# Patient Record
Sex: FEMALE | Race: WHITE | Employment: STUDENT | URBAN - NONMETROPOLITAN AREA
[De-identification: names, ages, dates, MRNs, and addresses within clinical notes are randomized per-mention and may not be internally consistent; named-entity substitution may affect disease eponyms.]

---

## 2023-02-28 ENCOUNTER — HOSPITAL ENCOUNTER (EMERGENCY)
Age: 23
Discharge: HOME | End: 2023-02-28
Payer: COMMERCIAL

## 2023-02-28 VITALS
HEIGHT: 65 IN | WEIGHT: 120 LBS | BODY MASS INDEX: 19.99 KG/M2 | SYSTOLIC BLOOD PRESSURE: 116 MMHG | TEMPERATURE: 98.3 F | DIASTOLIC BLOOD PRESSURE: 69 MMHG | HEART RATE: 110 BPM | OXYGEN SATURATION: 98 % | RESPIRATION RATE: 18 BRPM

## 2023-02-28 DIAGNOSIS — L73.9 FOLLICULITIS: ICD-10-CM

## 2023-02-28 DIAGNOSIS — J02.9 ACUTE PHARYNGITIS, UNSPECIFIED ETIOLOGY: Primary | ICD-10-CM

## 2023-02-28 DIAGNOSIS — A60.00 HERPES SIMPLEX INFECTION OF GENITOURINARY SYSTEM: ICD-10-CM

## 2023-02-28 LAB — S PYO AG THROAT QL: NEGATIVE

## 2023-02-28 PROCEDURE — 87253 VIRUS INOCULATE TISSUE ADDL: CPT

## 2023-02-28 PROCEDURE — 87252 VIRUS INOCULATION TISSUE: CPT

## 2023-02-28 PROCEDURE — 99213 OFFICE O/P EST LOW 20 MIN: CPT | Performed by: NURSE PRACTITIONER

## 2023-02-28 PROCEDURE — 99203 OFFICE O/P NEW LOW 30 MIN: CPT

## 2023-02-28 PROCEDURE — 87651 STREP A DNA AMP PROBE: CPT

## 2023-02-28 PROCEDURE — 87140 CULTURE TYPE IMMUNOFLUORESC: CPT

## 2023-02-28 RX ORDER — IBUPROFEN 800 MG/1
800 TABLET ORAL EVERY 8 HOURS PRN
Qty: 30 TABLET | Refills: 0 | Status: SHIPPED | OUTPATIENT
Start: 2023-02-28 | End: 2023-03-10

## 2023-02-28 RX ORDER — SULFAMETHOXAZOLE AND TRIMETHOPRIM 800; 160 MG/1; MG/1
1 TABLET ORAL 2 TIMES DAILY
Qty: 20 TABLET | Refills: 0 | Status: SHIPPED | OUTPATIENT
Start: 2023-02-28 | End: 2023-03-10

## 2023-02-28 RX ORDER — VALACYCLOVIR HYDROCHLORIDE 1 G/1
1000 TABLET, FILM COATED ORAL 2 TIMES DAILY
Qty: 14 TABLET | Refills: 0 | Status: SHIPPED | OUTPATIENT
Start: 2023-02-28 | End: 2023-03-07

## 2023-02-28 ASSESSMENT — PAIN DESCRIPTION - PAIN TYPE: TYPE: ACUTE PAIN

## 2023-02-28 ASSESSMENT — ENCOUNTER SYMPTOMS
SHORTNESS OF BREATH: 0
EYE DISCHARGE: 0
VOMITING: 0
COUGH: 0
TROUBLE SWALLOWING: 0
DIARRHEA: 0
NAUSEA: 0
SORE THROAT: 1
EYE REDNESS: 0
RHINORRHEA: 0

## 2023-02-28 ASSESSMENT — PAIN DESCRIPTION - ONSET: ONSET: GRADUAL

## 2023-02-28 ASSESSMENT — PAIN - FUNCTIONAL ASSESSMENT
PAIN_FUNCTIONAL_ASSESSMENT: ACTIVITIES ARE NOT PREVENTED
PAIN_FUNCTIONAL_ASSESSMENT: 0-10

## 2023-02-28 ASSESSMENT — PAIN DESCRIPTION - FREQUENCY: FREQUENCY: CONTINUOUS

## 2023-02-28 ASSESSMENT — PAIN DESCRIPTION - LOCATION: LOCATION: THROAT

## 2023-02-28 ASSESSMENT — PAIN DESCRIPTION - DESCRIPTORS: DESCRIPTORS: ACHING;SORE

## 2023-02-28 ASSESSMENT — PAIN SCALES - GENERAL: PAINLEVEL_OUTOF10: 7

## 2023-02-28 NOTE — ED NOTES
In with Alejandro Carter CNP for visual exam labia.  HSV swab done and sent to lab     Po Navarrete RN  02/28/23 [id number]

## 2023-02-28 NOTE — DISCHARGE INSTRUCTIONS
Take all medications as prescribed. You may use Tylenol or Motrin per package instructions for pain. Seek emergency treatment for fever greater than 101.5 for greater than 3 days, increased pain, or new or unusual symptoms.

## 2023-02-28 NOTE — Clinical Note
William Hernandez was seen and treated in our emergency department on 2/28/2023. She may return to school on 03/02/2023. If you have any questions or concerns, please don't hesitate to call.       Bret Mcnamara, HANNAH - CNP

## 2023-02-28 NOTE — ED PROVIDER NOTES
[facility] URGENT CARE  Urgent Care Encounter      CHIEF COMPLAINT       Chief Complaint   Patient presents with    Pharyngitis    Other     Pt thinks she cut herself while shaving.       Nurses Notes reviewed and I agree except as noted in the HPI.  HISTORY OF PRESENT ILLNESS   Krissy Chao is a 22 y.o. female who presents with a sore throat, fever, and lesions on her labia since Thursday.  Patient states she shaved in her genital area 4 days ago and has had mild pain since.  She went to the [facility] and was told that she had herpes.  She did not receive treatment at that time.  She is sexually active with 1 partner.  She denies chance of pregnancy at today's visit.  She declines further STD testing today.    REVIEW OF SYSTEMS     Review of Systems   Constitutional:  Negative for chills, diaphoresis, fatigue and fever.   HENT:  Positive for sore throat. Negative for congestion, ear pain, rhinorrhea and trouble swallowing.    Eyes:  Negative for discharge and redness.   Respiratory:  Negative for cough and shortness of breath.    Cardiovascular:  Negative for chest pain.   Gastrointestinal:  Negative for diarrhea, nausea and vomiting.   Genitourinary:  Negative for decreased urine volume.        Lesion on labia   Musculoskeletal:  Negative for neck pain and neck stiffness.   Skin:  Negative for rash.   Neurological:  Negative for headaches.   Hematological:  Negative for adenopathy.   Psychiatric/Behavioral:  Negative for sleep disturbance.      PAST MEDICAL HISTORY   History reviewed. No pertinent past medical history.    SURGICAL HISTORY     Patient  has no past surgical history on file.    CURRENT MEDICATIONS       Previous Medications    No medications on file       ALLERGIES     Patient is has No Known Allergies.    FAMILY HISTORY     Patient'sfamily history is not on file.    SOCIAL HISTORY     Patient  reports that she has never smoked. She has never been exposed to tobacco smoke. She has  never used smokeless tobacco. She reports that she does not drink alcohol and does not use drugs. PHYSICAL EXAM     ED TRIAGE VITALS  BP: 116/69, Temp: 98.3 °F (36.8 °C), Heart Rate: (!) 110, Resp: 18, SpO2: 98 %  Physical Exam  Vitals and nursing note reviewed. Exam conducted with a chaperone present. Constitutional:       General: She is not in acute distress. Appearance: Normal appearance. She is normal weight. HENT:      Head: Normocephalic and atraumatic. Right Ear: Tympanic membrane normal.      Left Ear: Tympanic membrane normal.      Nose: Nose normal.      Mouth/Throat:      Mouth: Mucous membranes are moist.      Pharynx: Oropharynx is clear. Eyes:      Conjunctiva/sclera: Conjunctivae normal.   Cardiovascular:      Rate and Rhythm: Normal rate and regular rhythm. Heart sounds: Normal heart sounds. Pulmonary:      Effort: Pulmonary effort is normal. No respiratory distress. Breath sounds: Normal breath sounds. No wheezing. Abdominal:      General: Abdomen is flat. Bowel sounds are normal.      Palpations: Abdomen is soft. Genitourinary:     Exam position: Supine. Labia:         Right: Lesion present. Left: Lesion present. Comments: Vesicular lesions to smith, left labia, and right labia. Pustular lesions to labia minora bilaterally  Musculoskeletal:         General: Normal range of motion. Cervical back: Normal range of motion and neck supple. Lymphadenopathy:      Cervical: No cervical adenopathy. Skin:     General: Skin is warm and dry. Capillary Refill: Capillary refill takes less than 2 seconds. Neurological:      General: No focal deficit present. Mental Status: She is alert and oriented to person, place, and time. Psychiatric:         Mood and Affect: Mood normal.         Behavior: Behavior normal.         Thought Content:  Thought content normal.         Judgment: Judgment normal.       DIAGNOSTIC RESULTS   Labs:  Results for orders placed or performed during the hospital encounter of 23   Strep Screen Group A Throat   Result Value Ref Range    Rapid Strep A Screen NEGATIVE        IMAGING:  No orders to display      URGENT CARE COURSE:     Vitals:    23 1433   BP: 116/69   Pulse: (!) 110   Resp: 18   Temp: 98.3 °F (36.8 °C)   TempSrc: Temporal   SpO2: 98%   Weight: 120 lb (54.4 kg)   Height: 5' 5\" (1.651 m)       Medications - No data to display  PROCEDURES:  None  FINAL IMPRESSION      1. Acute pharyngitis, unspecified etiology    2. Herpes simplex infection of genitourinary system    3. Folliculitis        DISPOSITION/PLAN   DISPOSITION Decision To Discharge 2023 03:00:18 PM    Patient potentially has genital herpes. We will send culture off and will treat based on physical exam today. We will also treat for folliculitis. Discharged home in good condition. Advised to follow-up with the PCP in 1 week for recheck.   PATIENT REFERRED TO:  [address]  [address] [place] [id number]  In 1 week  If symptoms worsen  DISCHARGE MEDICATIONS:  New Prescriptions    IBUPROFEN (ADVIL;MOTRIN) 800 MG TABLET    Take 1 tablet by mouth every 8 hours as needed for Pain or Fever    SULFAMETHOXAZOLE-TRIMETHOPRIM (BACTRIM DS) 800-160 MG PER TABLET    Take 1 tablet by mouth 2 times daily for 10 days    VALACYCLOVIR (VALTREX) 1 G TABLET    Take 1 tablet by mouth 2 times daily for 7 days     Current Discharge Medication List            [facility]  [address] HANNAH Rae - CNP  23 [id number]

## 2023-02-28 NOTE — ED TRIAGE NOTES
Pt to [facility] ambulatory with a sore throat, and has a laceration on her labia from shaving. This started on Sunday.

## 2023-03-03 LAB
HSV SPEC CULT: NORMAL
HSV1 ISLT QL IF: NORMAL

## 2024-04-16 ENCOUNTER — OFFICE VISIT (OUTPATIENT)
Age: 24
End: 2024-04-16

## 2024-04-16 VITALS
BODY MASS INDEX: 19.99 KG/M2 | DIASTOLIC BLOOD PRESSURE: 80 MMHG | RESPIRATION RATE: 14 BRPM | WEIGHT: 120 LBS | HEIGHT: 65 IN | HEART RATE: 79 BPM | TEMPERATURE: 98.1 F | SYSTOLIC BLOOD PRESSURE: 100 MMHG | OXYGEN SATURATION: 100 %

## 2024-04-16 DIAGNOSIS — J02.9 ACUTE VIRAL PHARYNGITIS: Primary | ICD-10-CM

## 2024-04-16 LAB
HETEROPHILE ANTIBODIES: NEGATIVE
STREPTOCOCCUS A RNA: NEGATIVE

## 2024-04-16 PROCEDURE — 87651 STREP A DNA AMP PROBE: CPT | Performed by: NURSE PRACTITIONER

## 2024-04-16 PROCEDURE — 99213 OFFICE O/P EST LOW 20 MIN: CPT | Performed by: NURSE PRACTITIONER

## 2024-04-16 PROCEDURE — 86308 HETEROPHILE ANTIBODY SCREEN: CPT | Performed by: NURSE PRACTITIONER

## 2024-04-16 RX ORDER — NORETHINDRONE ACETATE AND ETHINYL ESTRADIOL, ETHINYL ESTRADIOL AND FERROUS FUMARATE 1MG-10(24)
1 KIT ORAL DAILY
COMMUNITY
Start: 2024-04-08

## 2024-04-16 SDOH — ECONOMIC STABILITY: HOUSING INSECURITY
IN THE LAST 12 MONTHS, WAS THERE A TIME WHEN YOU DID NOT HAVE A STEADY PLACE TO SLEEP OR SLEPT IN A SHELTER (INCLUDING NOW)?: NO

## 2024-04-16 SDOH — ECONOMIC STABILITY: INCOME INSECURITY: HOW HARD IS IT FOR YOU TO PAY FOR THE VERY BASICS LIKE FOOD, HOUSING, MEDICAL CARE, AND HEATING?: NOT VERY HARD

## 2024-04-16 SDOH — ECONOMIC STABILITY: FOOD INSECURITY: WITHIN THE PAST 12 MONTHS, YOU WORRIED THAT YOUR FOOD WOULD RUN OUT BEFORE YOU GOT MONEY TO BUY MORE.: NEVER TRUE

## 2024-04-16 SDOH — ECONOMIC STABILITY: FOOD INSECURITY: WITHIN THE PAST 12 MONTHS, THE FOOD YOU BOUGHT JUST DIDN'T LAST AND YOU DIDN'T HAVE MONEY TO GET MORE.: NEVER TRUE

## 2024-04-16 ASSESSMENT — PATIENT HEALTH QUESTIONNAIRE - PHQ9
2. FEELING DOWN, DEPRESSED OR HOPELESS: NOT AT ALL
SUM OF ALL RESPONSES TO PHQ QUESTIONS 1-9: 0
SUM OF ALL RESPONSES TO PHQ QUESTIONS 1-9: 0
SUM OF ALL RESPONSES TO PHQ9 QUESTIONS 1 & 2: 0
SUM OF ALL RESPONSES TO PHQ QUESTIONS 1-9: 0
SUM OF ALL RESPONSES TO PHQ QUESTIONS 1-9: 0
1. LITTLE INTEREST OR PLEASURE IN DOING THINGS: NOT AT ALL

## 2024-04-16 ASSESSMENT — ENCOUNTER SYMPTOMS
SORE THROAT: 1
DIARRHEA: 0
COUGH: 0
NAUSEA: 0
ABDOMINAL PAIN: 0
SHORTNESS OF BREATH: 0
WHEEZING: 0
VOMITING: 0

## 2024-04-16 NOTE — PROGRESS NOTES
[facility] [place] [facility]  [facility] - [facility]  [address] [address]  Dept: [phone]  Loc: [phone]     Krissy Chao is a 23 y.o. female who presents today for:  Chief Complaint   Patient presents with    Pharyngitis     X 2 DAYS        Assessment/Plan:     1. Acute viral pharyngitis  -strep and mono neg, declines throat culture.  F/u if no improvement after 7-10 days or if develop a fever.       Results for orders placed or performed in visit on 04/16/24   POCT Rapid Strep A DNA (Alere i)   Result Value Ref Range    Streptococcus A RNA NEGATIVE    POCT Infectious mononucleosis Abs (mono)   Result Value Ref Range    Monospot NEGATIVE         Medications Ordered:  No orders of the defined types were placed in this encounter.      No follow-ups on file.    No future appointments.    HPI:   Pt presents with sore throat and chills x2 days.  Denies fever, cough, and congestion.  Has taken Tylenol with minimal relief.    Pharyngitis  This is a new problem. The current episode started in the past 7 days. Associated symptoms include chills and a sore throat. Pertinent negatives include no abdominal pain, congestion, coughing, fever, headaches, myalgias, nausea, rash or vomiting. She has tried acetaminophen for the symptoms. The treatment provided mild relief.         Subjective:      Review of Systems   Constitutional:  Positive for chills. Negative for fever.   HENT:  Positive for sore throat. Negative for congestion.    Respiratory:  Negative for cough, shortness of breath and wheezing.    Gastrointestinal:  Negative for abdominal pain, diarrhea, nausea and vomiting.   Musculoskeletal:  Negative for myalgias.   Skin:  Negative for rash.   Neurological:  Negative for dizziness, light-headedness and headaches.         Objective:     Vitals:    04/16/24 1436   BP: 100/80   Pulse: 79   Resp: 14   Temp: 98.1 °F (36.7 °C)   TempSrc: Tympanic   SpO2: 100%

## 2024-08-28 ENCOUNTER — OFFICE VISIT (OUTPATIENT)
Age: 24
End: 2024-08-28

## 2024-08-28 ENCOUNTER — OFFICE VISIT (OUTPATIENT)
Age: 24
End: 2024-08-28
Payer: COMMERCIAL

## 2024-08-28 VITALS
SYSTOLIC BLOOD PRESSURE: 118 MMHG | DIASTOLIC BLOOD PRESSURE: 80 MMHG | HEIGHT: 65 IN | WEIGHT: 110 LBS | OXYGEN SATURATION: 98 % | RESPIRATION RATE: 18 BRPM | BODY MASS INDEX: 18.33 KG/M2 | HEART RATE: 72 BPM | TEMPERATURE: 99.3 F

## 2024-08-28 DIAGNOSIS — F50.02 ANOREXIA NERVOSA, BINGE EATING/PURGING TYPE: Primary | ICD-10-CM

## 2024-08-28 DIAGNOSIS — F41.1 GENERALIZED ANXIETY DISORDER: ICD-10-CM

## 2024-08-28 DIAGNOSIS — F50.02 ANOREXIA NERVOSA, BINGE-EATING PURGING TYPE: Primary | ICD-10-CM

## 2024-08-28 PROBLEM — N39.41 URGE INCONTINENCE OF URINE: Status: RESOLVED | Noted: 2024-06-18 | Resolved: 2024-08-28

## 2024-08-28 PROBLEM — F50.029 ANOREXIA NERVOSA, BINGE EATING/PURGING TYPE: Status: ACTIVE | Noted: 2024-08-28

## 2024-08-28 PROBLEM — N39.41 URGE INCONTINENCE OF URINE: Status: ACTIVE | Noted: 2024-06-18

## 2024-08-28 LAB
25(OH)D3 SERPL-MCNC: 43 NG/ML (ref 30–100)
ALBUMIN SERPL BCG-MCNC: 5.2 G/DL (ref 3.5–5.1)
ALP SERPL-CCNC: 35 U/L (ref 38–126)
ALT SERPL W/O P-5'-P-CCNC: 26 U/L (ref 11–66)
ANION GAP SERPL CALC-SCNC: 14 MEQ/L (ref 8–16)
AST SERPL-CCNC: 24 U/L (ref 5–40)
BASOPHILS ABSOLUTE: 0 THOU/MM3 (ref 0–0.1)
BASOPHILS NFR BLD AUTO: 1.2 %
BILIRUB SERPL-MCNC: 0.6 MG/DL (ref 0.3–1.2)
BUN SERPL-MCNC: 15 MG/DL (ref 7–22)
CALCIUM SERPL-MCNC: 9.7 MG/DL (ref 8.5–10.5)
CHLORIDE SERPL-SCNC: 97 MEQ/L (ref 98–111)
CO2 SERPL-SCNC: 24 MEQ/L (ref 23–33)
CREAT SERPL-MCNC: 0.8 MG/DL (ref 0.4–1.2)
DEPRECATED RDW RBC AUTO: 49.7 FL (ref 35–45)
EOSINOPHIL NFR BLD AUTO: 0.9 %
EOSINOPHILS ABSOLUTE: 0 THOU/MM3 (ref 0–0.4)
ERYTHROCYTE [DISTWIDTH] IN BLOOD BY AUTOMATED COUNT: 13.6 % (ref 11.5–14.5)
GFR SERPL CREATININE-BSD FRML MDRD: > 90 ML/MIN/1.73M2
GLUCOSE SERPL-MCNC: 85 MG/DL (ref 70–108)
HCT VFR BLD AUTO: 41 % (ref 37–47)
HGB BLD-MCNC: 13.5 GM/DL (ref 12–16)
IMM GRANULOCYTES # BLD AUTO: 0 THOU/MM3 (ref 0–0.07)
IMM GRANULOCYTES NFR BLD AUTO: 0 %
INR PPP: 0.97 (ref 0.85–1.13)
LYMPHOCYTES ABSOLUTE: 1.8 THOU/MM3 (ref 1–4.8)
LYMPHOCYTES NFR BLD AUTO: 53.1 %
MCH RBC QN AUTO: 32.7 PG (ref 26–33)
MCHC RBC AUTO-ENTMCNC: 32.9 GM/DL (ref 32.2–35.5)
MCV RBC AUTO: 99.3 FL (ref 81–99)
MONOCYTES ABSOLUTE: 0.2 THOU/MM3 (ref 0.4–1.3)
MONOCYTES NFR BLD AUTO: 7.4 %
NEUTROPHILS ABSOLUTE: 1.2 THOU/MM3 (ref 1.8–7.7)
NEUTROPHILS NFR BLD AUTO: 37.4 %
NRBC BLD AUTO-RTO: 0 /100 WBC
PLATELET # BLD AUTO: 233 THOU/MM3 (ref 130–400)
PMV BLD AUTO: 9.8 FL (ref 9.4–12.4)
POTASSIUM SERPL-SCNC: 4.3 MEQ/L (ref 3.5–5.2)
PROT SERPL-MCNC: 7.5 G/DL (ref 6.1–8)
RBC # BLD AUTO: 4.13 MILL/MM3 (ref 4.2–5.4)
SODIUM SERPL-SCNC: 135 MEQ/L (ref 135–145)
TSH SERPL DL<=0.005 MIU/L-ACNC: 1.09 UIU/ML (ref 0.4–4.2)
WBC # BLD AUTO: 3.3 THOU/MM3 (ref 4.8–10.8)

## 2024-08-28 PROCEDURE — 99213 OFFICE O/P EST LOW 20 MIN: CPT | Performed by: NURSE PRACTITIONER

## 2024-08-28 PROCEDURE — 90791 PSYCH DIAGNOSTIC EVALUATION: CPT | Performed by: COUNSELOR

## 2024-08-28 PROCEDURE — 36415 COLL VENOUS BLD VENIPUNCTURE: CPT | Performed by: NURSE PRACTITIONER

## 2024-08-28 RX ORDER — ESCITALOPRAM OXALATE 5 MG/1
5 TABLET ORAL DAILY
Qty: 30 TABLET | Refills: 0 | Status: SHIPPED | OUTPATIENT
Start: 2024-08-28

## 2024-08-28 ASSESSMENT — PATIENT HEALTH QUESTIONNAIRE - PHQ9
9. THOUGHTS THAT YOU WOULD BE BETTER OFF DEAD, OR OF HURTING YOURSELF: NOT AT ALL
SUM OF ALL RESPONSES TO PHQ QUESTIONS 1-9: 4
8. MOVING OR SPEAKING SO SLOWLY THAT OTHER PEOPLE COULD HAVE NOTICED. OR THE OPPOSITE, BEING SO FIGETY OR RESTLESS THAT YOU HAVE BEEN MOVING AROUND A LOT MORE THAN USUAL: NOT AT ALL
6. FEELING BAD ABOUT YOURSELF - OR THAT YOU ARE A FAILURE OR HAVE LET YOURSELF OR YOUR FAMILY DOWN: MORE THAN HALF THE DAYS
SUM OF ALL RESPONSES TO PHQ QUESTIONS 1-9: 4
2. FEELING DOWN, DEPRESSED OR HOPELESS: NOT AT ALL
SUM OF ALL RESPONSES TO PHQ QUESTIONS 1-9: 4
1. LITTLE INTEREST OR PLEASURE IN DOING THINGS: NOT AT ALL
5. POOR APPETITE OR OVEREATING: MORE THAN HALF THE DAYS
4. FEELING TIRED OR HAVING LITTLE ENERGY: NOT AT ALL
3. TROUBLE FALLING OR STAYING ASLEEP: NOT AT ALL
7. TROUBLE CONCENTRATING ON THINGS, SUCH AS READING THE NEWSPAPER OR WATCHING TELEVISION: NOT AT ALL
SUM OF ALL RESPONSES TO PHQ QUESTIONS 1-9: 4
SUM OF ALL RESPONSES TO PHQ9 QUESTIONS 1 & 2: 0

## 2024-08-28 ASSESSMENT — ANXIETY QUESTIONNAIRES
IF YOU CHECKED OFF ANY PROBLEMS ON THIS QUESTIONNAIRE, HOW DIFFICULT HAVE THESE PROBLEMS MADE IT FOR YOU TO DO YOUR WORK, TAKE CARE OF THINGS AT HOME, OR GET ALONG WITH OTHER PEOPLE: NOT DIFFICULT AT ALL
1. FEELING NERVOUS, ANXIOUS, OR ON EDGE: MORE THAN HALF THE DAYS
6. BECOMING EASILY ANNOYED OR IRRITABLE: MORE THAN HALF THE DAYS
2. NOT BEING ABLE TO STOP OR CONTROL WORRYING: NEARLY EVERY DAY
GAD7 TOTAL SCORE: 11
7. FEELING AFRAID AS IF SOMETHING AWFUL MIGHT HAPPEN: SEVERAL DAYS
5. BEING SO RESTLESS THAT IT IS HARD TO SIT STILL: NOT AT ALL
3. WORRYING TOO MUCH ABOUT DIFFERENT THINGS: MORE THAN HALF THE DAYS
4. TROUBLE RELAXING: SEVERAL DAYS

## 2024-08-28 ASSESSMENT — ENCOUNTER SYMPTOMS
ABDOMINAL PAIN: 0
DIARRHEA: 0
NAUSEA: 0
VOMITING: 0

## 2024-08-28 NOTE — PATIENT INSTRUCTIONS
During the first week after starting this medication, you may experience headaches and nausea- this should improve after the first week or two.  Other common side effects include sleep disturbance, sexual dysfunction, and weight gain.  Do not drink alcohol with this medication.  Symptoms may start to improve within 3-4 weeks but may take up to 6.  Do not abruptly stop this medication- if at any time you would like to stop, please reach out to a health care provider.  Serotonin syndrome is a rare but serious side effect of this mediation- go to the ER if you experience muscle twitching/tremors, muscle rigidity, involuntary muscle movements, profuse sweating, rapid heartrate, high temperature, or confusion.  There is a Black Box warning that states rarely patients may experience worsening depression or suicidal thoughts with this medication- if you experience this, reach out to a health care provider, go to the hospital, or call 9-1-1.

## 2024-08-28 NOTE — PROGRESS NOTES
Counseling Center Intake  María Ma, [facility]-S   8/28/2024  12:07 PM      Time spent with Patient:  45 minutes    Reason for Consult:  Eating Disorder          Pt provided informed consent for the [facility]. Discussed with patient model of service to include the limits of confidentiality (i.e. abuse reporting, suicide intervention, etc.) and short-term intervention focused approach.  Pt indicated understanding.      Subjective:  Presenting Problem:  Eating Disorder    Objective:  MSE:    Appearance    alert, crying, smiling  Appetite Student reports ignoring her hunger pains and waiting until the end of the day to eat, which results in binging and purging.  She also restricts her calories.   Sleep disturbance  Student reports staying up until late hours and averaging 5 hrs. Of sleep per night throughout the week and weekend.  According to student, this has always been her pattern.  When she does go to bed she falls asleep right away and does not wake up until morning.   Loss of pleasure No  Speech    spontaneous, normal rate, normal volume, and well articulated  Mood    Anxious  Affect    normal affect  Thought Content    excessive preoccupations, obsessions, and intrusive thoughts  Insight    Good  Judgment    Intact  Suicide Assessment    no suicidal ideation   If yes, please indicate further:      History:    Trauma History: According to student, she experienced two traumatic loses one being the sudden death of her boyfriends younger brother and the second the sudden loss of a friend who collapsed on stage during a homecoming event. According to student, she began to binge and skip meals following those two events.      Prior  treatment:    Medications:   Current Outpatient Medications   Medication Sig Dispense Refill    norethindrone-ethinyl estradiol-Fe (LO LOESTRIN FE) 1 MG-10 MCG / 10 MCG tablet Take 1 tablet by mouth daily       No current facility-administered medications

## 2024-08-29 ENCOUNTER — HOSPITAL ENCOUNTER (OUTPATIENT)
Age: 24
Discharge: HOME | End: 2024-08-29
Payer: COMMERCIAL

## 2024-08-29 DIAGNOSIS — F50.02 ANOREXIA NERVOSA, BINGE-EATING PURGING TYPE: ICD-10-CM

## 2024-08-29 LAB
EKG ATRIAL RATE: 50 BPM
EKG P AXIS: 65 DEGREES
EKG P-R INTERVAL: 132 MS
EKG Q-T INTERVAL: 452 MS
EKG QRS DURATION: 82 MS
EKG QTC CALCULATION (BAZETT): 412 MS
EKG R AXIS: 65 DEGREES
EKG T AXIS: 60 DEGREES
EKG VENTRICULAR RATE: 50 BPM

## 2024-08-29 PROCEDURE — 93010 ELECTROCARDIOGRAM REPORT: CPT | Performed by: NUCLEAR MEDICINE

## 2024-08-29 PROCEDURE — 93005 ELECTROCARDIOGRAM TRACING: CPT

## 2024-09-04 ENCOUNTER — OFFICE VISIT (OUTPATIENT)
Age: 24
End: 2024-09-04
Payer: COMMERCIAL

## 2024-09-04 ENCOUNTER — NURSE ONLY (OUTPATIENT)
Age: 24
End: 2024-09-04

## 2024-09-04 DIAGNOSIS — F50.02 ANOREXIA NERVOSA, BINGE-EATING PURGING TYPE: Primary | ICD-10-CM

## 2024-09-04 DIAGNOSIS — F50.2 BULIMIA NERVOSA, PURGING TYPE: Primary | ICD-10-CM

## 2024-09-04 PROBLEM — F50.20 BULIMIA NERVOSA, PURGING TYPE: Status: ACTIVE | Noted: 2024-09-04

## 2024-09-04 LAB
BASOPHILS ABSOLUTE: 0 THOU/MM3 (ref 0–0.1)
BASOPHILS NFR BLD AUTO: 0.8 %
DEPRECATED RDW RBC AUTO: 48.3 FL (ref 35–45)
EOSINOPHIL NFR BLD AUTO: 1.4 %
EOSINOPHILS ABSOLUTE: 0.1 THOU/MM3 (ref 0–0.4)
ERYTHROCYTE [DISTWIDTH] IN BLOOD BY AUTOMATED COUNT: 13.2 % (ref 11.5–14.5)
HCT VFR BLD AUTO: 37.8 % (ref 37–47)
HGB BLD-MCNC: 12.3 GM/DL (ref 12–16)
IMM GRANULOCYTES # BLD AUTO: 0 THOU/MM3 (ref 0–0.07)
IMM GRANULOCYTES NFR BLD AUTO: 0 %
LYMPHOCYTES ABSOLUTE: 1.9 THOU/MM3 (ref 1–4.8)
LYMPHOCYTES NFR BLD AUTO: 53.2 %
MCH RBC QN AUTO: 32.5 PG (ref 26–33)
MCHC RBC AUTO-ENTMCNC: 32.5 GM/DL (ref 32.2–35.5)
MCV RBC AUTO: 100 FL (ref 81–99)
MONOCYTES ABSOLUTE: 0.3 THOU/MM3 (ref 0.4–1.3)
MONOCYTES NFR BLD AUTO: 8.2 %
NEUTROPHILS ABSOLUTE: 1.3 THOU/MM3 (ref 1.8–7.7)
NEUTROPHILS NFR BLD AUTO: 36.4 %
NRBC BLD AUTO-RTO: 0 /100 WBC
PLATELET # BLD AUTO: 187 THOU/MM3 (ref 130–400)
PMV BLD AUTO: 9.6 FL (ref 9.4–12.4)
RBC # BLD AUTO: 3.78 MILL/MM3 (ref 4.2–5.4)
WBC # BLD AUTO: 3.6 THOU/MM3 (ref 4.8–10.8)

## 2024-09-04 PROCEDURE — 90834 PSYTX W PT 45 MINUTES: CPT | Performed by: COUNSELOR

## 2024-09-04 NOTE — PROGRESS NOTES
Krissy Chao (:  2000) is a 24 y.o. female here for ongoing treatment of the following chief complaint(s):  No chief complaint on file.       Subjective    Student reports that she has made progress with creating a bedtime routine that allows her to maintain a 7 hr. Sleep schedule.  She also reports developing a meal plan for the week however she did not follow it yesterday and feels bad.  According to student, she would like to work towards eating at least 3 meals a day rather than waiting until the end of the day to eat.  Although she maintains a healthy diet she continues to restrict her calories and purge in an effort to appear perfect.          Objective   Students affect seems broad and congruent and her mood euthymic.  She was engaging and conversant and able to generate topics for discussion.  Her insight was age appropriate and she seemed alert and motivated.        Assessment & Plan   Therapist provided therapeutic support through active listening, empathy and offering insights when necessary.  Therapist assisted student with addressing and discussing past experiences that have contributed to her core beliefs. Also, student was encouraged to identify the purpose of her eating disorder and what feelings it was covering up such as feeling of inadequacy, helplessness  and that she is not enough.  Student seemed to gain new insights through this discussion.  Also, identified a strategy (steps she can take),  to begin to eating at regular intervals.  Student also agreed to continue maintaining a 7 hr sleep schedule until her next appointment.  Student denies experiencing any SI's or symptoms of depression at this time.   No diagnosis found.   No follow-ups on file.             An electronic signature was used to authenticate this note.    ERON Pillai-S 2024 11:53 AM

## 2024-09-04 NOTE — PROGRESS NOTES
Blood draw today from Right side collection successfully . Patient tolerated procedure well . Band aide applied to site

## 2024-09-11 ENCOUNTER — OFFICE VISIT (OUTPATIENT)
Age: 24
End: 2024-09-11
Payer: COMMERCIAL

## 2024-09-11 DIAGNOSIS — F50.2 BULIMIA NERVOSA, PURGING TYPE: ICD-10-CM

## 2024-09-11 DIAGNOSIS — F50.02 ANOREXIA NERVOSA, BINGE EATING/PURGING TYPE: Primary | ICD-10-CM

## 2024-09-11 PROCEDURE — 90834 PSYTX W PT 45 MINUTES: CPT | Performed by: COUNSELOR

## 2024-09-18 ENCOUNTER — OFFICE VISIT (OUTPATIENT)
Age: 24
End: 2024-09-18

## 2024-09-18 VITALS
HEART RATE: 64 BPM | BODY MASS INDEX: 18.14 KG/M2 | OXYGEN SATURATION: 99 % | DIASTOLIC BLOOD PRESSURE: 60 MMHG | SYSTOLIC BLOOD PRESSURE: 90 MMHG | WEIGHT: 109 LBS

## 2024-09-18 DIAGNOSIS — F41.1 GENERALIZED ANXIETY DISORDER: Primary | ICD-10-CM

## 2024-09-18 PROCEDURE — 99213 OFFICE O/P EST LOW 20 MIN: CPT | Performed by: NURSE PRACTITIONER

## 2024-09-18 ASSESSMENT — PATIENT HEALTH QUESTIONNAIRE - PHQ9
5. POOR APPETITE OR OVEREATING: NEARLY EVERY DAY
SUM OF ALL RESPONSES TO PHQ QUESTIONS 1-9: 7
1. LITTLE INTEREST OR PLEASURE IN DOING THINGS: SEVERAL DAYS
SUM OF ALL RESPONSES TO PHQ QUESTIONS 1-9: 7
8. MOVING OR SPEAKING SO SLOWLY THAT OTHER PEOPLE COULD HAVE NOTICED. OR THE OPPOSITE, BEING SO FIGETY OR RESTLESS THAT YOU HAVE BEEN MOVING AROUND A LOT MORE THAN USUAL: NOT AT ALL
SUM OF ALL RESPONSES TO PHQ QUESTIONS 1-9: 7
4. FEELING TIRED OR HAVING LITTLE ENERGY: NOT AT ALL
SUM OF ALL RESPONSES TO PHQ9 QUESTIONS 1 & 2: 2
3. TROUBLE FALLING OR STAYING ASLEEP: NOT AT ALL
SUM OF ALL RESPONSES TO PHQ QUESTIONS 1-9: 7
7. TROUBLE CONCENTRATING ON THINGS, SUCH AS READING THE NEWSPAPER OR WATCHING TELEVISION: SEVERAL DAYS
2. FEELING DOWN, DEPRESSED OR HOPELESS: SEVERAL DAYS
6. FEELING BAD ABOUT YOURSELF - OR THAT YOU ARE A FAILURE OR HAVE LET YOURSELF OR YOUR FAMILY DOWN: SEVERAL DAYS
9. THOUGHTS THAT YOU WOULD BE BETTER OFF DEAD, OR OF HURTING YOURSELF: NOT AT ALL

## 2024-09-18 ASSESSMENT — ENCOUNTER SYMPTOMS
NAUSEA: 0
DIARRHEA: 0
VOMITING: 0
ABDOMINAL PAIN: 0

## 2024-09-23 RX ORDER — ESCITALOPRAM OXALATE 10 MG/1
10 TABLET ORAL DAILY
Qty: 30 TABLET | Refills: 0 | Status: SHIPPED | OUTPATIENT
Start: 2024-09-23

## 2024-09-25 ENCOUNTER — OFFICE VISIT (OUTPATIENT)
Age: 24
End: 2024-09-25

## 2024-09-25 ENCOUNTER — OFFICE VISIT (OUTPATIENT)
Age: 24
End: 2024-09-25
Payer: COMMERCIAL

## 2024-09-25 VITALS
BODY MASS INDEX: 18.33 KG/M2 | HEART RATE: 64 BPM | SYSTOLIC BLOOD PRESSURE: 95 MMHG | TEMPERATURE: 97.7 F | HEIGHT: 65 IN | WEIGHT: 110 LBS | DIASTOLIC BLOOD PRESSURE: 65 MMHG | OXYGEN SATURATION: 100 %

## 2024-09-25 DIAGNOSIS — F50.02 ANOREXIA NERVOSA WITH BULIMIA: Primary | ICD-10-CM

## 2024-09-25 DIAGNOSIS — F41.1 GENERALIZED ANXIETY DISORDER: Primary | ICD-10-CM

## 2024-09-25 DIAGNOSIS — F50.02 ANOREXIA NERVOSA, BINGE-EATING PURGING TYPE: ICD-10-CM

## 2024-09-25 PROCEDURE — 90834 PSYTX W PT 45 MINUTES: CPT | Performed by: COUNSELOR

## 2024-09-25 PROCEDURE — 99213 OFFICE O/P EST LOW 20 MIN: CPT | Performed by: NURSE PRACTITIONER

## 2024-09-25 ASSESSMENT — PATIENT HEALTH QUESTIONNAIRE - PHQ9
SUM OF ALL RESPONSES TO PHQ QUESTIONS 1-9: 7
SUM OF ALL RESPONSES TO PHQ9 QUESTIONS 1 & 2: 1
3. TROUBLE FALLING OR STAYING ASLEEP: NOT AT ALL
SUM OF ALL RESPONSES TO PHQ QUESTIONS 1-9: 7
7. TROUBLE CONCENTRATING ON THINGS, SUCH AS READING THE NEWSPAPER OR WATCHING TELEVISION: SEVERAL DAYS
8. MOVING OR SPEAKING SO SLOWLY THAT OTHER PEOPLE COULD HAVE NOTICED. OR THE OPPOSITE, BEING SO FIGETY OR RESTLESS THAT YOU HAVE BEEN MOVING AROUND A LOT MORE THAN USUAL: NOT AT ALL
SUM OF ALL RESPONSES TO PHQ QUESTIONS 1-9: 7
4. FEELING TIRED OR HAVING LITTLE ENERGY: SEVERAL DAYS
9. THOUGHTS THAT YOU WOULD BE BETTER OFF DEAD, OR OF HURTING YOURSELF: NOT AT ALL
6. FEELING BAD ABOUT YOURSELF - OR THAT YOU ARE A FAILURE OR HAVE LET YOURSELF OR YOUR FAMILY DOWN: MORE THAN HALF THE DAYS
1. LITTLE INTEREST OR PLEASURE IN DOING THINGS: SEVERAL DAYS
SUM OF ALL RESPONSES TO PHQ QUESTIONS 1-9: 7
5. POOR APPETITE OR OVEREATING: MORE THAN HALF THE DAYS
2. FEELING DOWN, DEPRESSED OR HOPELESS: NOT AT ALL

## 2024-09-25 ASSESSMENT — ENCOUNTER SYMPTOMS
PHOTOPHOBIA: 0
COLOR CHANGE: 0

## 2024-09-26 RX ORDER — ESCITALOPRAM OXALATE 5 MG/1
5 TABLET ORAL DAILY
Qty: 30 TABLET | OUTPATIENT
Start: 2024-09-26

## 2024-09-27 PROBLEM — F50.02 ANOREXIA NERVOSA WITH BULIMIA: Status: ACTIVE | Noted: 2024-09-27

## 2024-09-27 PROBLEM — F50.029 ANOREXIA NERVOSA WITH BULIMIA: Status: ACTIVE | Noted: 2024-09-27

## 2024-10-01 NOTE — PROGRESS NOTES
incontinence.  Had an episode this summer and was diagnosed with a UTI.  Incontinence resolved with treatment of UTI.  Started having some more urge incontinence again recently, but denies any urinary symptoms.  Would like to hold off on any urinary studies at this time, but plans to monitor symptoms closely.      Past Medical History:   Diagnosis Date    Urge incontinence of urine 06/18/2024    6-month history of noted incontinence with urgency.  States when she has the urge to urinate she will be incontinent on her way to the restroom.  She denies burning with urination, frequency, urgency, blood in her urine, foul-smelling urine, or lower abdominal pain.  She has not had a pregnancy.  She is sexually active but does not feel like that aggravates her symptoms.        History reviewed. No pertinent surgical history.    History reviewed. No pertinent family history.    Social History     Tobacco Use    Smoking status: Never     Passive exposure: Never    Smokeless tobacco: Never   Substance Use Topics    Alcohol use: Never      Current Outpatient Medications   Medication Sig Dispense Refill    escitalopram (LEXAPRO) 10 MG tablet Take 1 tablet by mouth daily 30 tablet 0    norethindrone-ethinyl estradiol-Fe (LO LOESTRIN FE) 1 MG-10 MCG / 10 MCG tablet Take 1 tablet by mouth daily       No current facility-administered medications for this visit.     No Known Allergies    Health Maintenance   Topic Date Due    HIV screen  Never done    Chlamydia/GC screen  Never done    Hepatitis C screen  Never done    Pap smear  Never done    DTaP/Tdap/Td vaccine (7 - Td or Tdap) 04/02/2023    COVID-19 Vaccine (2 - 2023-24 season) 09/01/2024    Depression Screen  09/25/2025    Hepatitis B vaccine  Completed    Hib vaccine  Completed    HPV vaccine  Completed    Polio vaccine  Completed    Varicella vaccine  Completed    Meningococcal (ACWY) vaccine  Completed    Flu vaccine  Completed    Hepatitis A vaccine  Aged Out    Pneumococcal

## 2024-10-02 ENCOUNTER — OFFICE VISIT (OUTPATIENT)
Dept: FAMILY MEDICINE CLINIC | Age: 24
End: 2024-10-02
Payer: COMMERCIAL

## 2024-10-02 ENCOUNTER — OFFICE VISIT (OUTPATIENT)
Age: 24
End: 2024-10-02
Payer: COMMERCIAL

## 2024-10-02 VITALS
HEART RATE: 62 BPM | WEIGHT: 108.8 LBS | OXYGEN SATURATION: 99 % | HEIGHT: 65 IN | RESPIRATION RATE: 16 BRPM | BODY MASS INDEX: 18.13 KG/M2 | DIASTOLIC BLOOD PRESSURE: 60 MMHG | SYSTOLIC BLOOD PRESSURE: 106 MMHG

## 2024-10-02 DIAGNOSIS — N39.41 URGE INCONTINENCE: ICD-10-CM

## 2024-10-02 DIAGNOSIS — F50.029 ANOREXIA NERVOSA WITH BULIMIA: Primary | ICD-10-CM

## 2024-10-02 DIAGNOSIS — F41.1 GAD (GENERALIZED ANXIETY DISORDER): ICD-10-CM

## 2024-10-02 DIAGNOSIS — N91.2 AMENORRHEA: Primary | ICD-10-CM

## 2024-10-02 DIAGNOSIS — F50.029 ANOREXIA NERVOSA, BINGE-EATING PURGING TYPE, UNSPECIFIED SEVERITY: ICD-10-CM

## 2024-10-02 PROCEDURE — 99213 OFFICE O/P EST LOW 20 MIN: CPT | Performed by: STUDENT IN AN ORGANIZED HEALTH CARE EDUCATION/TRAINING PROGRAM

## 2024-10-02 PROCEDURE — 90832 PSYTX W PT 30 MINUTES: CPT | Performed by: COUNSELOR

## 2024-10-02 SDOH — HEALTH STABILITY: PHYSICAL HEALTH: ON AVERAGE, HOW MANY MINUTES DO YOU ENGAGE IN EXERCISE AT THIS LEVEL?: 60 MIN

## 2024-10-02 SDOH — HEALTH STABILITY: PHYSICAL HEALTH: ON AVERAGE, HOW MANY DAYS PER WEEK DO YOU ENGAGE IN MODERATE TO STRENUOUS EXERCISE (LIKE A BRISK WALK)?: 7 DAYS

## 2024-10-02 NOTE — PATIENT INSTRUCTIONS
[facility] -- Dr. Potts, Dr. Garcia, Dr. Mac  [facility] -- Dr. Chen, Dr. Solomon  [facility] -- Dr. Iraheta

## 2024-10-02 NOTE — PROGRESS NOTES
Krissy Chao (:  2000) is a 24 y.o. female here for ongoing treatment of the following chief complaint(s):  Chief Complaint   Patient presents with    Eating Disorder        Subjective    Student reports purging one time since her last counseling appointment.  According to student, she has made some progress with overcoming her eating disorder, such as; limiting her physical exercise, increasing her daily calorie intake, avoiding the scales, meal prepping, including more food choices and eating no less than 3 meals a day and also posting words of affirmations around her room to better manage her thoughts.  Student was able to make the connection between her recent binging and purging to skipping meals which leads to binging and feeling miserable, which also triggers her urge to purge. Student continues to work with an online nutritionalist with meal planning and to also discuss why she feels so uncomfortable after she eats a full meal. Her nutritionalist suggested it's because her stomach is small and her body is still getting use to digesting other foods. Hallie has an appointment today with her gynecologist and agreed to let them know that she has an eating disorder.          Objective   Students affect seems broad and congruent with content of session. She appeared extremely underweight but is consistent with her medical appointments as well as her counseling appointments and is slowly making progress with making the life style changes necessary for overcoming this issue. Student spoke quietly and her voice seemed weak and lacking in energy.  She also admits that she has to use clothes pins on her pants to keep them from falling off.  She was engaging and conversant and able to generate topics for discussion. Student denies experiencing any SI's or symptoms of depression.         Assessment & Plan therapist provided active listening, empathy, insight while gathering relevant information and

## 2024-10-03 ASSESSMENT — ENCOUNTER SYMPTOMS
COUGH: 0
ABDOMINAL PAIN: 0
SHORTNESS OF BREATH: 0
NAUSEA: 0
VOMITING: 0

## 2024-10-09 ENCOUNTER — OFFICE VISIT (OUTPATIENT)
Age: 24
End: 2024-10-09
Payer: COMMERCIAL

## 2024-10-09 DIAGNOSIS — F50.029 ANOREXIA NERVOSA WITH BULIMIA: Primary | ICD-10-CM

## 2024-10-09 PROCEDURE — 90832 PSYTX W PT 30 MINUTES: CPT | Performed by: COUNSELOR

## 2024-10-09 NOTE — PROGRESS NOTES
taking a walk, grabbing a healthy stack, mindfulness breathing and calling her mom or boyfriend. Student was also encouraged to get rid of the foods that she typically binges on such as dry cereal ect.  Therapist also introduced mental imagery and mental rehearsals as a way to begin managing her thoughts and urges.  Student reports feeling happy and motivated at the end of this session and seemed eager to begin implementing some of these changes before her next session.         Assessment & Plan     ICD-10-CM    1. Anorexia nervosa with bulimia  F50.029          Return in about 2 weeks (around 10/23/2024).             An electronic signature was used to authenticate this note.    María Ma, [facility] 10/9/2024 11:38 AM

## 2024-10-23 ENCOUNTER — OFFICE VISIT (OUTPATIENT)
Age: 24
End: 2024-10-23
Payer: COMMERCIAL

## 2024-10-23 ENCOUNTER — OFFICE VISIT (OUTPATIENT)
Age: 24
End: 2024-10-23

## 2024-10-23 VITALS
SYSTOLIC BLOOD PRESSURE: 95 MMHG | WEIGHT: 116 LBS | TEMPERATURE: 97.3 F | BODY MASS INDEX: 19.33 KG/M2 | DIASTOLIC BLOOD PRESSURE: 64 MMHG | HEART RATE: 60 BPM | HEIGHT: 65 IN

## 2024-10-23 DIAGNOSIS — F41.1 GENERALIZED ANXIETY DISORDER: Primary | ICD-10-CM

## 2024-10-23 DIAGNOSIS — F50.029 ANOREXIA NERVOSA WITH BULIMIA: Primary | ICD-10-CM

## 2024-10-23 DIAGNOSIS — F50.020 MILD BINGE-EATING PURGING TYPE ANOREXIA NERVOSA: ICD-10-CM

## 2024-10-23 PROCEDURE — 90834 PSYTX W PT 45 MINUTES: CPT | Performed by: COUNSELOR

## 2024-10-23 PROCEDURE — 99213 OFFICE O/P EST LOW 20 MIN: CPT | Performed by: NURSE PRACTITIONER

## 2024-10-23 RX ORDER — ESCITALOPRAM OXALATE 10 MG/1
10 TABLET ORAL DAILY
Qty: 30 TABLET | Refills: 0 | Status: SHIPPED | OUTPATIENT
Start: 2024-10-23

## 2024-10-23 ASSESSMENT — ENCOUNTER SYMPTOMS
DIARRHEA: 0
COUGH: 0
WHEEZING: 0
SHORTNESS OF BREATH: 0
ABDOMINAL PAIN: 0
NAUSEA: 0
CHEST TIGHTNESS: 0
COLOR CHANGE: 0
VOMITING: 0

## 2024-10-23 NOTE — PROGRESS NOTES
Krissy Chao (:  2000) is a 24 y.o. female here for ongoing treatment of the following chief complaint(s):  Chief Complaint   Patient presents with    Anxiety    Other    Student presents the following symptoms; anxiousness, tearfulness, heart palpitations, shortness of breath, shakiness and racing thoughts.     Subjective    Student reports coming directly from the [facility] where she was asked to weigh in.  According to student, the fact that she gained 5 lbs triggered feelings of intense anxiety and also led to an emotional outbursts.   According to student, her brain is telling her that her weight gain is bad however she is also able to recognize that this is an irrational thought that has led her to binge and purge in the past.  Student entered session appearing anxious and tearful and at first seemed unable to calm herself down.          Objective   Students affect seems broad and congruent with the content of this session.  She appeared tearful and anxious however she was also engaging and conversant and seemed to eventually calm down before leaving the session.  She denies experiencing SI's or self harming.  Her functional status was intact  and her thought processes seemed age appropriate.         Assessment & Plan  Therapist provided active listening, empathy , insight while gathering irrelevant information and assessing mental health symptoms.  Therapist and student addressed and discussed her current thoughts and feelings about her recent weight gain.  Therapist validated students feelings and also gently challenged her distorted thought processes that seemed to trigger her anxiety.  Therapist introduced CBT and Mindfulness concepts and techniques to help student recognize the connection between her thoughts and her feelings and begin to replace unhealthy and distorted thoughts with thoughts that are reassuring and self soothing.  Also, assigned student to write a letter to her body

## 2024-10-23 NOTE — PROGRESS NOTES
[facility] [place] [facility]  [facility] [place] [address] [address]  Dept: [phone]  Loc: [phone]     Krissy Chao is a 24 y.o. female who presents today for:  Chief Complaint   Patient presents with    Follow-up     Medication recheck       Assessment/Plan:     1. Generalized anxiety disorder  -escitalopram refilled.  She is not interested in increasing dose at this time    2. Mild binge-eating purging type anorexia nervosa  -wt is 116 lbs today, BMI is 18.7.  no dry skin or orthostatic pulse noted.  Cap refill is brisk.  -pt is tearful during exam d/t recent weight gain.  Normalized pt's feelings and provided empathy and active listening.  Discussed and normalized conflicting feelings of wanting to recover while also not liking her body and what the scale shows.  We also discussed to try to avoiding weighing herself at home and we will not discuss weight (numbers) at future visits    -She has an appt with María at the counseling center today, I also called María to update her.  -advised we f/u in 2 weeks, pt prefers 4 weeks at this time but will call health center if she would like to be seen sooner.       No results found for any visits on 10/23/24.     Medications Ordered:  Orders Placed This Encounter   Medications    escitalopram (LEXAPRO) 10 MG tablet     Sig: Take 1 tablet by mouth daily     Dispense:  30 tablet     Refill:  0       Return in about 4 weeks (around 11/20/2024).    Future Appointments   Date Time Provider Department Center   12/4/2024 12:00 PM SCHEDULE, [facility] DEP       HPI:   Pt presents for YONI/eating disorder f/u/refill of escitalopram.  She was last seen in office on 9/25/2024 and had a 1 month supply at that time.  She continues to tolerate medication well with no adverse reactions.  She is unsure how much it's helping but is not interested in increasing her dose at this time.  States she continues

## 2024-11-06 ENCOUNTER — OFFICE VISIT (OUTPATIENT)
Age: 24
End: 2024-11-06
Payer: COMMERCIAL

## 2024-11-06 DIAGNOSIS — F50.9 EATING DISORDER WITH ONGOING TREATMENT: Primary | ICD-10-CM

## 2024-11-06 PROCEDURE — 90832 PSYTX W PT 30 MINUTES: CPT | Performed by: COUNSELOR

## 2024-11-06 NOTE — PROGRESS NOTES
Krissy Chao (:  2000) is a 24 y.o. female here for ongoing treatment of the following chief complaint(s):  Chief Complaint   Patient presents with    Other    Stress    Student reports that she binged twice last week due to her academic stress but did not purge either time. She also reports feeling more energetic and her mood is also stable and elevated compared to her first session.  She continues to progress with adding calories and eating nutritionally balanced meals.     Subjective    Student reports that in response to her stress she ate an entire bag of popcorn and later felt extremely guilty and gross about her body followed by days where she felt frustrated and sad.  A few days later student attended a [employer] concert which left her feeling renewed and mentally refreshed.  Student also read the letter to her body that was assigned during her last session. The letter was written as an apology to her body in that she identified several negative thoughts and beliefs that seemed to be at the core of her eating disorder. She was unable to write a letter from her body in that it triggered an emotional outbursts.           Objective   Students affect seems broad and congruent and her mood euthymic. She was engaging and conversant and able to generate topics for discussion.  Student appeared healthy and alert and also energetic.        Assessment & Plan   Therapist provided active listening, empathy and insight while gathering relevant information and assessing her mental health symptoms and her recent eating habits and behavior. Therapist challenged the thoughts and feelings that proceeded and followed her binging and she was able to see how her perfectionistic mindset and her attitude fuel her binging.  Therapist and student identified ways to prepare for her times of stress by portioning out her binging foods and allowing herself to eat these foods without feeling guilt or shame.  Discussed

## 2024-11-07 ENCOUNTER — OFFICE VISIT (OUTPATIENT)
Age: 24
End: 2024-11-07

## 2024-11-07 VITALS
BODY MASS INDEX: 18.99 KG/M2 | OXYGEN SATURATION: 96 % | HEART RATE: 72 BPM | WEIGHT: 114 LBS | RESPIRATION RATE: 16 BRPM | DIASTOLIC BLOOD PRESSURE: 74 MMHG | SYSTOLIC BLOOD PRESSURE: 110 MMHG | HEIGHT: 65 IN

## 2024-11-07 DIAGNOSIS — F50.020 MILD BINGE-EATING PURGING TYPE ANOREXIA NERVOSA: Primary | ICD-10-CM

## 2024-11-07 PROCEDURE — 99213 OFFICE O/P EST LOW 20 MIN: CPT | Performed by: NURSE PRACTITIONER

## 2024-11-07 ASSESSMENT — PATIENT HEALTH QUESTIONNAIRE - PHQ9
3. TROUBLE FALLING OR STAYING ASLEEP: NEARLY EVERY DAY
1. LITTLE INTEREST OR PLEASURE IN DOING THINGS: SEVERAL DAYS
SUM OF ALL RESPONSES TO PHQ QUESTIONS 1-9: 9
SUM OF ALL RESPONSES TO PHQ QUESTIONS 1-9: 9
4. FEELING TIRED OR HAVING LITTLE ENERGY: MORE THAN HALF THE DAYS
SUM OF ALL RESPONSES TO PHQ QUESTIONS 1-9: 9
8. MOVING OR SPEAKING SO SLOWLY THAT OTHER PEOPLE COULD HAVE NOTICED. OR THE OPPOSITE, BEING SO FIGETY OR RESTLESS THAT YOU HAVE BEEN MOVING AROUND A LOT MORE THAN USUAL: NOT AT ALL
2. FEELING DOWN, DEPRESSED OR HOPELESS: NOT AT ALL
SUM OF ALL RESPONSES TO PHQ9 QUESTIONS 1 & 2: 1
6. FEELING BAD ABOUT YOURSELF - OR THAT YOU ARE A FAILURE OR HAVE LET YOURSELF OR YOUR FAMILY DOWN: MORE THAN HALF THE DAYS
SUM OF ALL RESPONSES TO PHQ QUESTIONS 1-9: 9
9. THOUGHTS THAT YOU WOULD BE BETTER OFF DEAD, OR OF HURTING YOURSELF: NOT AT ALL
5. POOR APPETITE OR OVEREATING: SEVERAL DAYS
7. TROUBLE CONCENTRATING ON THINGS, SUCH AS READING THE NEWSPAPER OR WATCHING TELEVISION: NOT AT ALL

## 2024-11-07 ASSESSMENT — ENCOUNTER SYMPTOMS
COUGH: 0
CHEST TIGHTNESS: 0
DIARRHEA: 0
WHEEZING: 0
SHORTNESS OF BREATH: 0
VOMITING: 0
NAUSEA: 0
ABDOMINAL PAIN: 0
COLOR CHANGE: 0

## 2024-11-07 NOTE — PROGRESS NOTES
[facility]  [facility]  [address] [address]  Dept: [phone]  Loc: [phone]     Krissy Chao is a 24 y.o. female who presents today for:  Chief Complaint   Patient presents with    Follow-up       Assessment/Plan:     1. Mild binge-eating purging type anorexia nervosa  -pt doing well.  Current BMI is 19.  Vitals stable.  No sxs of concern  -f/u in 2 weeks, sooner if needed.       -Need repeat labs/EKG at next visit (will be 12 weeks since initial labs).       No results found for any visits on 11/07/24.     Medications Ordered:  No orders of the defined types were placed in this encounter.      Return in about 2 weeks (around 11/21/2024).    Future Appointments   Date Time Provider Department Center   11/20/2024  1:00 PM María Ma, ERON-S srpx onu [facility] [place]   11/21/2024 11:30 AM Yohana Bergman APRN - CNP ONU [facility]   12/4/2024 12:00 PM SCHEDULE, SRPX ONU ONU [facility]       HPI:   Pt presents for 2 week f/u for eating disorder management.  She states she's having a good week and continues to work on increasing caloric intake.  Denies chest pain, heart palpitations, lightheadedness, dizziness, syncope, chest pain, tinnitus, and trouble breathing.  She had an appt at the counseling center yesterday, next appt is in 2 weeks.  She continues to meet with a nutritionist.  PHQ-9= 9      Subjective:      Review of Systems   Constitutional:  Negative for chills and fever.   Respiratory:  Negative for cough, chest tightness, shortness of breath and wheezing.    Cardiovascular:  Negative for chest pain and palpitations.   Gastrointestinal:  Negative for abdominal pain, diarrhea, nausea and vomiting.   Skin:  Negative for color change, pallor and rash.   Neurological:  Negative for dizziness, tremors, syncope, weakness, light-headedness and headaches.   Psychiatric/Behavioral:  Negative for dysphoric

## 2024-11-21 RX ORDER — ESCITALOPRAM OXALATE 10 MG/1
10 TABLET ORAL DAILY
Qty: 30 TABLET | Refills: 0 | Status: SHIPPED | OUTPATIENT
Start: 2024-11-21

## 2024-11-21 NOTE — TELEPHONE ENCOUNTER
This medication refill is regarding a electronic request.  Refill requested by patient.    Requested Prescriptions     Pending Prescriptions Disp Refills    escitalopram (LEXAPRO) 10 MG tablet 30 tablet 0     Sig: Take 1 tablet by mouth daily       Date of last visit: 11/7/2024  Date of next visit: 12/4/24  Date of last refill: 10/23/24  Pharmacy Name: [facility], [place]

## 2024-12-10 ENCOUNTER — NURSE ONLY (OUTPATIENT)
Age: 24
End: 2024-12-10

## 2024-12-10 DIAGNOSIS — R63.0 ANOREXIA: Primary | ICD-10-CM

## 2024-12-10 LAB
25(OH)D3 SERPL-MCNC: 27 NG/ML (ref 30–100)
ALBUMIN SERPL BCG-MCNC: 4.7 G/DL (ref 3.5–5.1)
ALP SERPL-CCNC: 51 U/L (ref 38–126)
ALT SERPL W/O P-5'-P-CCNC: 25 U/L (ref 11–66)
ANION GAP SERPL CALC-SCNC: 14 MEQ/L (ref 8–16)
AST SERPL-CCNC: 28 U/L (ref 5–40)
BASOPHILS ABSOLUTE: 0.1 THOU/MM3 (ref 0–0.1)
BASOPHILS NFR BLD AUTO: 1.4 %
BILIRUB SERPL-MCNC: 0.5 MG/DL (ref 0.3–1.2)
BUN SERPL-MCNC: 12 MG/DL (ref 7–22)
CALCIUM SERPL-MCNC: 9.4 MG/DL (ref 8.5–10.5)
CHLORIDE SERPL-SCNC: 97 MEQ/L (ref 98–111)
CO2 SERPL-SCNC: 26 MEQ/L (ref 23–33)
CREAT SERPL-MCNC: 0.8 MG/DL (ref 0.4–1.2)
DEPRECATED RDW RBC AUTO: 44.2 FL (ref 35–45)
EOSINOPHIL NFR BLD AUTO: 1.4 %
EOSINOPHILS ABSOLUTE: 0.1 THOU/MM3 (ref 0–0.4)
ERYTHROCYTE [DISTWIDTH] IN BLOOD BY AUTOMATED COUNT: 12 % (ref 11.5–14.5)
GFR SERPL CREATININE-BSD FRML MDRD: > 90 ML/MIN/1.73M2
GLUCOSE SERPL-MCNC: 73 MG/DL (ref 70–108)
HCT VFR BLD AUTO: 40.3 % (ref 37–47)
HGB BLD-MCNC: 13.2 GM/DL (ref 12–16)
IMM GRANULOCYTES # BLD AUTO: 0.01 THOU/MM3 (ref 0–0.07)
IMM GRANULOCYTES NFR BLD AUTO: 0.2 %
INR PPP: 0.97 (ref 0.85–1.13)
LYMPHOCYTES ABSOLUTE: 2.2 THOU/MM3 (ref 1–4.8)
LYMPHOCYTES NFR BLD AUTO: 49 %
MCH RBC QN AUTO: 32.2 PG (ref 26–33)
MCHC RBC AUTO-ENTMCNC: 32.8 GM/DL (ref 32.2–35.5)
MCV RBC AUTO: 98.3 FL (ref 81–99)
MONOCYTES ABSOLUTE: 0.3 THOU/MM3 (ref 0.4–1.3)
MONOCYTES NFR BLD AUTO: 7.1 %
NEUTROPHILS ABSOLUTE: 1.8 THOU/MM3 (ref 1.8–7.7)
NEUTROPHILS NFR BLD AUTO: 40.9 %
NRBC BLD AUTO-RTO: 0 /100 WBC
PLATELET # BLD AUTO: 235 THOU/MM3 (ref 130–400)
PMV BLD AUTO: 9.4 FL (ref 9.4–12.4)
POTASSIUM SERPL-SCNC: 3.8 MEQ/L (ref 3.5–5.2)
PROT SERPL-MCNC: 7.1 G/DL (ref 6.1–8)
RBC # BLD AUTO: 4.1 MILL/MM3 (ref 4.2–5.4)
SODIUM SERPL-SCNC: 137 MEQ/L (ref 135–145)
TSH SERPL DL<=0.005 MIU/L-ACNC: 1.04 UIU/ML (ref 0.4–4.2)
WBC # BLD AUTO: 4.4 THOU/MM3 (ref 4.8–10.8)

## 2024-12-11 ENCOUNTER — TELEPHONE (OUTPATIENT)
Age: 24
End: 2024-12-11

## 2024-12-11 DIAGNOSIS — F50.020 MILD BINGE-EATING PURGING TYPE ANOREXIA NERVOSA: Primary | ICD-10-CM

## 2024-12-11 DIAGNOSIS — D72.819 LEUKOPENIA, UNSPECIFIED TYPE: ICD-10-CM

## 2024-12-11 NOTE — TELEPHONE ENCOUNTER
Notified pt of lab results.  Advised OTC vit D supplement and recheck in 12 weeks.  Also discussed low WBC count.  WBC count is trending up, however since it is still low will refer to hematology for further evaluation.  Instructed pt to f/u with health center if she has not heard from heme within 1 week.    Also advised pt make an appt at the health center this week or next week for follow up.  Last in-office visit was >1 month ago.  She states she is not sure of school schedule at this time but will call [facility] to schedule an appt.  It has also been 3 months since pt's last EKG, so will reorder that as well.  Pt verbalized understanding of plan of care.

## 2024-12-20 RX ORDER — ESCITALOPRAM OXALATE 10 MG/1
10 TABLET ORAL DAILY
Qty: 30 TABLET | Refills: 0 | Status: SHIPPED | OUTPATIENT
Start: 2024-12-20

## 2025-04-21 ENCOUNTER — CLINICAL SUPPORT (OUTPATIENT)
Age: 25
End: 2025-04-21

## 2025-04-21 DIAGNOSIS — Z11.1 TUBERCULOSIS SCREENING: Primary | ICD-10-CM

## 2025-04-21 PROCEDURE — 86580 TB INTRADERMAL TEST: CPT | Performed by: NURSE PRACTITIONER

## 2025-04-21 NOTE — PROGRESS NOTES
After obtaining consent, and per orders of HANNAH Smith  Immunization(s) and/or medication(s) given during visit by Citlali Edwards RN, tolerated procedure well.      Immunizations Administered       Name Date Dose Route    PPD Test 4/21/2025 0.1 mL Intradermal    Site: Left Forearm    Lot: [id number]    NDC: 52504-777-76

## 2025-04-23 ENCOUNTER — CLINICAL SUPPORT (OUTPATIENT)
Age: 25
End: 2025-04-23

## 2025-04-23 DIAGNOSIS — Z11.1 TUBERCULOSIS SCREENING: Primary | ICD-10-CM

## 2025-04-23 LAB
INDURATION: NORMAL
TB SKIN TEST: NORMAL

## 2025-04-28 ENCOUNTER — CLINICAL SUPPORT (OUTPATIENT)
Age: 25
End: 2025-04-28

## 2025-04-28 DIAGNOSIS — Z11.1 TUBERCULOSIS SCREENING: Primary | ICD-10-CM

## 2025-04-28 PROCEDURE — 86580 TB INTRADERMAL TEST: CPT | Performed by: NURSE PRACTITIONER

## 2025-04-29 ENCOUNTER — OFFICE VISIT (OUTPATIENT)
Age: 25
End: 2025-04-29

## 2025-04-29 VITALS
WEIGHT: 115 LBS | HEIGHT: 65 IN | OXYGEN SATURATION: 100 % | BODY MASS INDEX: 19.16 KG/M2 | TEMPERATURE: 98.4 F | DIASTOLIC BLOOD PRESSURE: 82 MMHG | RESPIRATION RATE: 16 BRPM | SYSTOLIC BLOOD PRESSURE: 124 MMHG | HEART RATE: 74 BPM

## 2025-04-29 DIAGNOSIS — Z00.00 HEALTHY ADULT ON ROUTINE PHYSICAL EXAMINATION: Primary | ICD-10-CM

## 2025-04-29 PROBLEM — F50.029 ANOREXIA NERVOSA, BINGE EATING/PURGING TYPE (HCC): Status: RESOLVED | Noted: 2024-08-28 | Resolved: 2025-04-29

## 2025-04-29 PROBLEM — F50.20: Status: RESOLVED | Noted: 2024-09-04 | Resolved: 2025-04-29

## 2025-04-29 PROBLEM — F50.9 EATING DISORDER WITH ONGOING TREATMENT: Status: RESOLVED | Noted: 2024-09-27 | Resolved: 2025-04-29

## 2025-04-29 PROCEDURE — 99395 PREV VISIT EST AGE 18-39: CPT | Performed by: NURSE PRACTITIONER

## 2025-04-29 RX ORDER — FLUOXETINE HYDROCHLORIDE 40 MG/1
40 CAPSULE ORAL DAILY
COMMUNITY
Start: 2025-03-06 | End: 2026-03-06

## 2025-04-29 SDOH — ECONOMIC STABILITY: FOOD INSECURITY: WITHIN THE PAST 12 MONTHS, YOU WORRIED THAT YOUR FOOD WOULD RUN OUT BEFORE YOU GOT MONEY TO BUY MORE.: NEVER TRUE

## 2025-04-29 SDOH — ECONOMIC STABILITY: FOOD INSECURITY: WITHIN THE PAST 12 MONTHS, THE FOOD YOU BOUGHT JUST DIDN'T LAST AND YOU DIDN'T HAVE MONEY TO GET MORE.: NEVER TRUE

## 2025-04-29 ASSESSMENT — ENCOUNTER SYMPTOMS
COUGH: 0
CHEST TIGHTNESS: 0
ABDOMINAL PAIN: 0
WHEEZING: 0
NAUSEA: 0
SORE THROAT: 0
VOMITING: 0
SHORTNESS OF BREATH: 0
DIARRHEA: 0

## 2025-04-29 ASSESSMENT — PATIENT HEALTH QUESTIONNAIRE - PHQ9
SUM OF ALL RESPONSES TO PHQ QUESTIONS 1-9: 0
1. LITTLE INTEREST OR PLEASURE IN DOING THINGS: NOT AT ALL
SUM OF ALL RESPONSES TO PHQ QUESTIONS 1-9: 0
1. LITTLE INTEREST OR PLEASURE IN DOING THINGS: NOT AT ALL
2. FEELING DOWN, DEPRESSED OR HOPELESS: NOT AT ALL
SUM OF ALL RESPONSES TO PHQ QUESTIONS 1-9: 0
2. FEELING DOWN, DEPRESSED OR HOPELESS: NOT AT ALL
SUM OF ALL RESPONSES TO PHQ QUESTIONS 1-9: 0

## 2025-04-29 NOTE — PROGRESS NOTES
intact. Gait normal.   Psychiatric:         Attention and Perception: Attention and perception normal.         Mood and Affect: Mood and affect normal.         Speech: Speech normal.         Behavior: Behavior normal. Behavior is cooperative.         Thought Content: Thought content normal.         Cognition and Memory: Cognition and memory normal.         Judgment: Judgment normal.           Patient given educational materials - see patient instructions.  Discussed use, benefit, and sideeffects of prescribed medications.  All patient questions answered.  Pt voiced understanding. Reviewed health maintenance.  Instructed to continue current medications, diet and exercise.  Patient agreed with treatment plan.Follow up as directed.     Electronically signed by HANNAH Smith CNP on 4/29/2025 at 9:35 AM

## 2025-04-30 ENCOUNTER — CLINICAL SUPPORT (OUTPATIENT)
Age: 25
End: 2025-04-30

## 2025-04-30 DIAGNOSIS — Z11.1 ENCOUNTER FOR PPD SKIN TEST READING: Primary | ICD-10-CM

## 2025-04-30 LAB
INDURATION: NORMAL
TB SKIN TEST: NEGATIVE